# Patient Record
Sex: FEMALE | Race: WHITE | NOT HISPANIC OR LATINO | Employment: OTHER | URBAN - METROPOLITAN AREA
[De-identification: names, ages, dates, MRNs, and addresses within clinical notes are randomized per-mention and may not be internally consistent; named-entity substitution may affect disease eponyms.]

---

## 2019-02-26 LAB — EJECTION FRACTION: 38

## 2019-07-02 ENCOUNTER — TRANSFERRED RECORDS (OUTPATIENT)
Dept: HEALTH INFORMATION MANAGEMENT | Facility: CLINIC | Age: 64
End: 2019-07-02

## 2019-08-26 LAB — TSH SERPL-ACNC: 7.76 MIU/ML (ref 0.47–5)

## 2019-08-28 ENCOUNTER — TRANSFERRED RECORDS (OUTPATIENT)
Dept: HEALTH INFORMATION MANAGEMENT | Facility: CLINIC | Age: 64
End: 2019-08-28

## 2019-09-03 ENCOUNTER — ANESTHESIA EVENT (OUTPATIENT)
Dept: SURGERY | Facility: CLINIC | Age: 64
End: 2019-09-03
Payer: COMMERCIAL

## 2019-09-03 ENCOUNTER — ANESTHESIA (OUTPATIENT)
Dept: SURGERY | Facility: CLINIC | Age: 64
End: 2019-09-03
Payer: COMMERCIAL

## 2019-09-03 ENCOUNTER — HOSPITAL ENCOUNTER (OUTPATIENT)
Facility: CLINIC | Age: 64
Discharge: HOME | End: 2019-09-03
Attending: OPHTHALMOLOGY | Admitting: OPHTHALMOLOGY
Payer: COMMERCIAL

## 2019-09-03 VITALS
WEIGHT: 133.3 LBS | HEIGHT: 65 IN | RESPIRATION RATE: 12 BRPM | SYSTOLIC BLOOD PRESSURE: 136 MMHG | HEART RATE: 63 BPM | TEMPERATURE: 96.7 F | DIASTOLIC BLOOD PRESSURE: 81 MMHG | OXYGEN SATURATION: 97 % | BODY MASS INDEX: 22.21 KG/M2

## 2019-09-03 PROCEDURE — 71000027 ZZH RECOVERY PHASE 2 EACH 15 MINS: Performed by: OPHTHALMOLOGY

## 2019-09-03 PROCEDURE — 37000009 ZZH ANESTHESIA TECHNICAL FEE, EACH ADDTL 15 MIN: Performed by: OPHTHALMOLOGY

## 2019-09-03 PROCEDURE — 37000008 ZZH ANESTHESIA TECHNICAL FEE, 1ST 30 MIN: Performed by: OPHTHALMOLOGY

## 2019-09-03 PROCEDURE — 25000128 H RX IP 250 OP 636: Performed by: NURSE ANESTHETIST, CERTIFIED REGISTERED

## 2019-09-03 PROCEDURE — 25000132 ZZH RX MED GY IP 250 OP 250 PS 637: Performed by: OPHTHALMOLOGY

## 2019-09-03 PROCEDURE — 71000012 ZZH RECOVERY PHASE 1 LEVEL 1 FIRST HR: Performed by: OPHTHALMOLOGY

## 2019-09-03 PROCEDURE — 25000125 ZZHC RX 250: Performed by: OPHTHALMOLOGY

## 2019-09-03 PROCEDURE — 40000170 ZZH STATISTIC PRE-PROCEDURE ASSESSMENT II: Performed by: OPHTHALMOLOGY

## 2019-09-03 PROCEDURE — 25000125 ZZHC RX 250: Performed by: NURSE ANESTHETIST, CERTIFIED REGISTERED

## 2019-09-03 PROCEDURE — 36000058 ZZH SURGERY LEVEL 3 EA 15 ADDTL MIN: Performed by: OPHTHALMOLOGY

## 2019-09-03 PROCEDURE — 27210794 ZZH OR GENERAL SUPPLY STERILE: Performed by: OPHTHALMOLOGY

## 2019-09-03 PROCEDURE — 36000056 ZZH SURGERY LEVEL 3 1ST 30 MIN: Performed by: OPHTHALMOLOGY

## 2019-09-03 PROCEDURE — 25800030 ZZH RX IP 258 OP 636: Performed by: NURSE ANESTHETIST, CERTIFIED REGISTERED

## 2019-09-03 RX ORDER — TRAMADOL HYDROCHLORIDE 50 MG/1
50 TABLET ORAL EVERY 6 HOURS PRN
Status: ON HOLD | COMMUNITY
End: 2019-09-03

## 2019-09-03 RX ORDER — ROSUVASTATIN CALCIUM 5 MG/1
5 TABLET, COATED ORAL DAILY
COMMUNITY
End: 2023-01-11

## 2019-09-03 RX ORDER — ERYTHROMYCIN 5 MG/G
OINTMENT OPHTHALMIC PRN
Status: DISCONTINUED | OUTPATIENT
Start: 2019-09-03 | End: 2019-09-03 | Stop reason: HOSPADM

## 2019-09-03 RX ORDER — LEVOTHYROXINE SODIUM 25 UG/1
25 TABLET ORAL DAILY
COMMUNITY
End: 2023-01-11

## 2019-09-03 RX ORDER — DIAZEPAM 5 MG
5 TABLET ORAL DAILY PRN
Status: ON HOLD | COMMUNITY
End: 2019-09-03

## 2019-09-03 RX ORDER — LIDOCAINE HYDROCHLORIDE 20 MG/ML
INJECTION, SOLUTION INFILTRATION; PERINEURAL PRN
Status: DISCONTINUED | OUTPATIENT
Start: 2019-09-03 | End: 2019-09-03

## 2019-09-03 RX ORDER — AMIODARONE HYDROCHLORIDE 200 MG/1
200 TABLET ORAL DAILY
Status: ON HOLD | COMMUNITY
End: 2019-09-03

## 2019-09-03 RX ORDER — ERYTHROMYCIN 5 MG/G
OINTMENT OPHTHALMIC
Status: DISCONTINUED
Start: 2019-09-03 | End: 2019-09-03 | Stop reason: HOSPADM

## 2019-09-03 RX ORDER — ACETAMINOPHEN 325 MG/1
325 TABLET ORAL EVERY 4 HOURS PRN
COMMUNITY

## 2019-09-03 RX ORDER — ASPIRIN 81 MG/1
81 TABLET, CHEWABLE ORAL DAILY
Status: ON HOLD | COMMUNITY
End: 2019-09-03

## 2019-09-03 RX ORDER — OMEPRAZOLE 40 MG/1
40 CAPSULE, DELAYED RELEASE ORAL DAILY
COMMUNITY
End: 2023-01-11

## 2019-09-03 RX ORDER — TETRACAINE HYDROCHLORIDE 5 MG/ML
SOLUTION OPHTHALMIC
Status: DISCONTINUED
Start: 2019-09-03 | End: 2019-09-03 | Stop reason: HOSPADM

## 2019-09-03 RX ORDER — HYDROCODONE BITARTRATE AND ACETAMINOPHEN 5; 325 MG/1; MG/1
1 TABLET ORAL ONCE
Status: COMPLETED | OUTPATIENT
Start: 2019-09-03 | End: 2019-09-03

## 2019-09-03 RX ORDER — LIDOCAINE HCL/EPINEPHRINE/PF 2%-1:200K
VIAL (ML) INJECTION
Status: DISCONTINUED
Start: 2019-09-03 | End: 2019-09-03 | Stop reason: HOSPADM

## 2019-09-03 RX ORDER — PROPOFOL 10 MG/ML
INJECTION, EMULSION INTRAVENOUS PRN
Status: DISCONTINUED | OUTPATIENT
Start: 2019-09-03 | End: 2019-09-03

## 2019-09-03 RX ORDER — ONDANSETRON 2 MG/ML
INJECTION INTRAMUSCULAR; INTRAVENOUS PRN
Status: DISCONTINUED | OUTPATIENT
Start: 2019-09-03 | End: 2019-09-03

## 2019-09-03 RX ORDER — SODIUM CHLORIDE, SODIUM LACTATE, POTASSIUM CHLORIDE, CALCIUM CHLORIDE 600; 310; 30; 20 MG/100ML; MG/100ML; MG/100ML; MG/100ML
INJECTION, SOLUTION INTRAVENOUS CONTINUOUS PRN
Status: DISCONTINUED | OUTPATIENT
Start: 2019-09-03 | End: 2019-09-03

## 2019-09-03 RX ORDER — LIDOCAINE HCL/EPINEPHRINE/PF 2%-1:200K
VIAL (ML) INJECTION PRN
Status: DISCONTINUED | OUTPATIENT
Start: 2019-09-03 | End: 2019-09-03 | Stop reason: HOSPADM

## 2019-09-03 RX ADMIN — HYDROCODONE BITARTRATE AND ACETAMINOPHEN 1 TABLET: 5; 325 TABLET ORAL at 10:01

## 2019-09-03 RX ADMIN — PROPOFOL 20 MG: 10 INJECTION, EMULSION INTRAVENOUS at 08:39

## 2019-09-03 RX ADMIN — DEXMEDETOMIDINE HYDROCHLORIDE 12 MCG: 100 INJECTION, SOLUTION INTRAVENOUS at 08:38

## 2019-09-03 RX ADMIN — MIDAZOLAM 2 MG: 1 INJECTION INTRAMUSCULAR; INTRAVENOUS at 08:32

## 2019-09-03 RX ADMIN — SODIUM CHLORIDE, POTASSIUM CHLORIDE, SODIUM LACTATE AND CALCIUM CHLORIDE: 600; 310; 30; 20 INJECTION, SOLUTION INTRAVENOUS at 08:30

## 2019-09-03 RX ADMIN — PROPOFOL 50 MG: 10 INJECTION, EMULSION INTRAVENOUS at 08:37

## 2019-09-03 RX ADMIN — ONDANSETRON 4 MG: 2 INJECTION INTRAMUSCULAR; INTRAVENOUS at 08:39

## 2019-09-03 RX ADMIN — LIDOCAINE HYDROCHLORIDE 50 MG: 20 INJECTION, SOLUTION INFILTRATION; PERINEURAL at 08:37

## 2019-09-03 ASSESSMENT — LIFESTYLE VARIABLES: TOBACCO_USE: 1

## 2019-09-03 ASSESSMENT — MIFFLIN-ST. JEOR: SCORE: 1165.52

## 2019-09-03 ASSESSMENT — ENCOUNTER SYMPTOMS: DYSRHYTHMIAS: 1

## 2019-09-03 NOTE — ANESTHESIA CARE TRANSFER NOTE
Patient: Hayden Ospina    Procedure(s):  BILATERAL UPPER LID PTOSIS AND MECHANICAL PTOSIS REPAIR    Diagnosis: BILATERAL UPPER LID PTOSIS; MECHANICAL PTOSIS  Diagnosis Additional Information: No value filed.    Anesthesia Type:   MAC     Note:  Airway :Room Air  Patient transferred to:PACU  Comments: Transferred to recovery room in recliner with armrests up, spontaneous respirations, O2 saturation maintained greater than 95% with oxygen via room air. All monitors and alarms on and functioning, clinically stable vital signs. Report given to recovery RN and questions answered. Patient alert and following verbal directions.Handoff Report: Identifed the Patient, Identified the Reponsible Provider, Reviewed the pertinent medical history, Discussed the surgical course, Reviewed Intra-OP anesthesia mangement and issues during anesthesia, Set expectations for post-procedure period and Allowed opportunity for questions and acknowledgement of understanding      Vitals: (Last set prior to Anesthesia Care Transfer)    CRNA VITALS  9/3/2019 0858 - 9/3/2019 0933      9/3/2019             Ht Rate:  SpO2: 81  97%                Electronically Signed By: TUAN South CRNA  September 3, 2019  9:33 AM

## 2019-09-03 NOTE — ANESTHESIA PREPROCEDURE EVALUATION
Anesthesia Pre-Procedure Evaluation    Patient: Hayden Ospina   MRN: [id number] : 1955          Preoperative Diagnosis: BILATERAL UPPER LID PTOSIS; MECHANICAL PTOSIS    Procedure(s):  BILATERAL UPPER LID PTOSIS AND MECHANICAL PTOSIS REPAIR    Past Medical History:   Diagnosis Date     Angioma      AV gurpreet re-entry tachycardia (H)      CPT2 deficiency (H)      Diastolic dysfunction      Dysphagia      Encephalitis      Hiatal hernia      Hyperlipidemia      IBS (irritable bowel syndrome)      ICD (implantable cardioverter-defibrillator) battery depletion      Lactic acidosis      Malignant neoplasm of skin      Migraine      Peptic ulcer      Psoriasis      Retinal detachment      Sarcoid myocarditis      Tachycardia      Ventricular tachyarrhythmia (H)      No past surgical history on file.     KG - 2019- SR, RAD, Septal infarct    Anesthesia Evaluation     . Pt has had prior anesthetic.     History of anesthetic complications   - PONV        ROS/MED HX    ENT/Pulmonary:     (+)tobacco use, , . .   (-) sleep apnea   Neurologic: Comment: Hx of encephalitis    (+)migraines,     Cardiovascular: Comment: AV gurpreet reentry tachycardia    Hx of Vtach - s/p AICD  Nonischemic cardiomyopathy  Sarcoid myocarditis     EF 57% 2018: EF 30 to 40% 2019, mild to moderate MR    Normal coronaries - 2019 - by angiogram    VT with ICD shocks 2019, no recurrence, patient was outside with temps well below zero    (+) Dyslipidemia, ----. : . . . :ICD . dysrhythmias Irregular Heartbeat/Palpitations, .       METS/Exercise Tolerance:     Hematologic:         Musculoskeletal: Comment: Lid ptosis  psoriasis        GI/Hepatic: Comment: Hx of peptic ulcer disease    (+) GERD Asymptomatic on medication, hiatal hernia,       Renal/Genitourinary:         Endo: Comment: CPT2 deficiency - some muscle symptoms  Hx of glycogen storage disease    (+) thyroid problem .      Psychiatric:     (+) psychiatric history anxiety     "  Infectious Disease:  - neg infectious disease ROS       Malignancy:   (+) Malignancy History of Skin          Other:                          Physical Exam  Normal systems: cardiovascular and pulmonary    Airway   Mallampati: I  TM distance: >3 FB  Neck ROM: full    Dental   (+) partials    Cardiovascular   Rhythm and rate: regular and normal      Pulmonary    breath sounds clear to auscultation            No results found for: WBC, HGB, HCT, PLT, CRP, SED, NA, POTASSIUM, CHLORIDE, CO2, BUN, CR, GLC, GILDARDO, PHOS, MAG, ALBUMIN, PROTTOTAL, ALT, AST, GGT, ALKPHOS, BILITOTAL, BILIDIRECT, LIPASE, AMYLASE, SAWYER, PTT, INR, FIBR, TSH, T4, T3, HCG, HCGS, CKTOTAL, CKMB, TROPN    Preop Vitals  BP Readings from Last 3 Encounters:   09/03/19 (!) 144/80    Pulse Readings from Last 3 Encounters:   No data found for Pulse      Resp Readings from Last 3 Encounters:   09/03/19 16    SpO2 Readings from Last 3 Encounters:   09/03/19 98%      Temp Readings from Last 1 Encounters:   09/03/19 36.3  C (97.3  F) (Oral)    Ht Readings from Last 1 Encounters:   09/03/19 1.651 m (5' 5\")      Wt Readings from Last 1 Encounters:   09/03/19 60.5 kg (133 lb 4.8 oz)    Estimated body mass index is 22.18 kg/m  as calculated from the following:    Height as of this encounter: 1.651 m (5' 5\").    Weight as of this encounter: 60.5 kg (133 lb 4.8 oz).       Anesthesia Plan      History & Physical Review  History and physical reviewed and following examination; no interval change.    ASA Status:  3 .    NPO Status:  > 8 hours    Plan for MAC Reason for MAC:  Procedure to face, neck, head or breast  PONV prophylaxis:  Ondansetron (or other 5HT-3) and Dexamethasone or Solumedrol       Postoperative Care  Postoperative pain management:  IV analgesics.      Consents  Anesthetic plan, risks, benefits and alternatives discussed with:  Patient..                 Kendall Donato MD  "

## 2019-09-03 NOTE — ANESTHESIA POSTPROCEDURE EVALUATION
Patient: Hayden Ospina    Procedure(s):  BILATERAL UPPER LID PTOSIS AND MECHANICAL PTOSIS REPAIR    Diagnosis:BILATERAL UPPER LID PTOSIS; MECHANICAL PTOSIS  Diagnosis Additional Information: No value filed.    Anesthesia Type:  MAC    Note:  Anesthesia Post Evaluation    Patient location during evaluation: PACU  Patient participation: Able to fully participate in evaluation  Level of consciousness: awake  Pain management: adequate  Airway patency: patent  Cardiovascular status: acceptable  Respiratory status: acceptable  Hydration status: acceptable  PONV: none     Anesthetic complications: None          Last vitals:  Vitals:    09/03/19 0930 09/03/19 0945 09/03/19 1042   BP: (!) 140/79 134/70 136/81   Pulse: 73 71 63   Resp: 16 10 12   Temp: 35.9  C (96.7  F)  35.9  C (96.7  F)   SpO2: 94% 96% 97%         Electronically Signed By: Kendall Donato MD  September 3, 2019  3:38 PM

## 2019-09-03 NOTE — DISCHARGE INSTRUCTIONS
Same Day Surgery Discharge Instructions for  Sedation and General Anesthesia       It's not unusual to feel dizzy, light-headed or faint for up to 24 hours after surgery or while taking pain medication.  If you have these symptoms: sit for a few minutes before standing and have someone assist you when you get up to walk or use the bathroom.      You should rest and relax for the next 24 hours. We recommend you make arrangements to have an adult stay with you for at least 24 hours after your discharge.  Avoid hazardous and strenuous activity.      DO NOT DRIVE any vehicle or operate mechanical equipment for 24 hours following the end of your surgery.  Even though you may feel normal, your reactions may be affected by the medication you have received.      Do not drink alcoholic beverages for 24 hours following surgery.       Slowly progress to your regular diet as you feel able. It's not unusual to feel nauseated and/or vomit after receiving anesthesia.  If you develop these symptoms, drink clear liquids (apple juice, ginger ale, broth, 7-up, etc. ) until you feel better.  If your nausea and vomiting persists for 24 hours, please notify your surgeon.        All narcotic pain medications, along with inactivity and anesthesia, can cause constipation. Drinking plenty of liquids and increasing fiber intake will help.      For any questions of a medical nature, call your surgeon.      Do not make important decisions for 24 hours.      If you had general anesthesia, you may have a sore throat for a couple of days related to the breathing tube used during surgery.  You may use Cepacol lozenges to help with this discomfort.  If it worsens or if you develop a fever, contact your surgeon.       If you feel your pain is not well managed with the pain medications prescribed by your surgeon, please contact your surgeon's office to let them know so they can address your concerns.     [facility]   Eyelid/Orbital  Surgery Discharge Instructions    Ehsan Talamantes M.D..     ICE COMPRESSES  Immediately following surgery, you should begin to apply ice compresses.  Apply a cold gel pack or wrap a clean washcloth around a cup of crushed ice in a plastic bag (a bag of frozen peas also works well) and hold the cold compresses directly against the closed eyelid (s).Apply cold pack for a minimum of six times daily for no longer than 15 minutes at a time. Continue cold compresses every day until the bruising and swelling begin to subside.  This can vary for each patient, but three 3 days may be common.    HOT COMPRESSES  After your swelling and bruising have begun to subside, hot compresses should be applied.  Take a clean washcloth and wring it out in hot water (as warm as you can tolerate comfortably).  Hold this warm compress against the closed eyelid(s) at least six times per day for 15 minutes.  This should be continued for about two weeks.    OINTMENT  You may be given some ointment when you leave the hospital.  Apply this ointment to the suture line(s) twice a day, 1/4 inch into the eye at bedtime for 7 days.  Expect some blurring of vision from the ointment.     ACTIVITY  Avoid heavy lifting or vigorous exercise for one week after surgery.  You may resume regular activities as tolerated.  You may shower and wash your hair on the day after surgery; be careful to avoid getting shampoo in your eyes. While your eyes are still swelling, it is recommended you sleep on your back and elevate your head with 2-3 pillows.    MEDICATION  If the doctor has given you some medications to take after surgery, please take these according to the instructions on the bottle.  Pain medications may make you drowsy so do not drive, operate heavy machinery, or use alcohol while taking it.  When you feel that you do not need the prescription pain medication, you may substitute Extra Strength Tylenol for mild pain by also following the directions on the  bottle.      YOU WERE GIVEN A HYDROCODONE AT THE HOSPITAL AT 10AM       If you were taking Coumadin (warfarin) prior to your surgery, you may resume this medication with your next scheduled dose.    WHAT TO EXPECT  You should expect some slight oozing of blood from the incision site over the first two to three days after surgery.  Swelling and bruising will occur for one to two weeks or longer.  You may also experience itching and tearing during the first several weeks after surgery.  This is part of the normal healing process    QUESTIONS  Please feel free to contact the office, should you have any questions that are not answered above.  The phone number is [phone].  Please call immediately if you are unable to establish vision in the operative eye, you are experiencing heavy bleeding that will not stop with gentle pressure or you have any signs of an infection (greenish/yellow discharge or progressive redness).    [facility]  [facility]  [address]  [place], [place] [address]  [phone]

## 2019-09-03 NOTE — OP NOTE
Preoperative Diagnosis: Bilateral Upper Lid Mechanical Ptosis  Postoperative Diagnosis: Bilateral Upper Lid Mechanical Ptosis  Operation: Bilateral Upper Lid Mechanical Ptosis repair    Anesthesia: Local    Complications: none    Indications for Surgery: Bilateral Upper lid mechanical ptosis obstructing the vision and interfering with daily activities.    Procedure: The patient was taken to the operating room and the upper eyelid creases were marked with a marking pen. The upper eyelids were then injected with 2% Lidocaine with 1:100,000 Epinephrine and Wadase given along both upper eyelid creases. The patient was prepped and draped in the usual sterile fashion. The planned skin incision was outlined with fine tipped marking pen.    A pair of .5 Castroviejo forceps were used to pinch the skin along the proposed excision to make sure that the eyelids would close well. Care was taken to avoid removing excessive skin and creating any lagophthalmos. This predetermined area was then excised with the #15 blade. This skin was then removed with Sridhar Scissors and the orbital septum was opened. Excessive orbital fat was excised. A small amount of orbicularis was also excised along the upper skin margin. The skin was then closed with running 6-0 fast absorbing gut suture.Erythromycin was applied to the suture line. At the end of the procedure, there was a normal contour to both upper eyelids and good closure of the eyelids. The patient left the operating room in stable condition.

## 2020-04-30 ENCOUNTER — HOSPITAL ENCOUNTER (EMERGENCY)
Facility: OTHER | Age: 65
Discharge: HOME | End: 2020-04-30
Payer: MEDICARE

## 2020-04-30 ENCOUNTER — HOSPITAL ENCOUNTER (EMERGENCY)
Facility: OTHER | Age: 65
End: 2020-04-30
Payer: MEDICARE

## 2023-01-11 ENCOUNTER — APPOINTMENT (OUTPATIENT)
Dept: GENERAL RADIOLOGY | Facility: OTHER | Age: 68
DRG: 310 | End: 2023-01-11
Attending: FAMILY MEDICINE
Payer: MEDICARE

## 2023-01-11 ENCOUNTER — HOSPITAL ENCOUNTER (INPATIENT)
Facility: OTHER | Age: 68
LOS: 2 days | Discharge: HOME | DRG: 310 | End: 2023-01-13
Attending: EMERGENCY MEDICINE | Admitting: FAMILY MEDICINE
Payer: MEDICARE
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
## 2023-01-11 DIAGNOSIS — Z95.810 PRESENCE OF AUTOMATIC CARDIOVERTER/DEFIBRILLATOR (AICD): ICD-10-CM

## 2023-01-11 DIAGNOSIS — I47.10 SVT (SUPRAVENTRICULAR TACHYCARDIA) (H): ICD-10-CM

## 2023-01-11 DIAGNOSIS — T82.897A OTHER SPECIFIED COMPLICATION OF CARDIAC PROSTHETIC DEVICES, IMPLANTS AND GRAFTS, INITIAL ENCOUNTER: ICD-10-CM

## 2023-01-11 DIAGNOSIS — Z45.02 AICD DISCHARGE: ICD-10-CM

## 2023-01-11 DIAGNOSIS — Z95.810 AICD (AUTOMATIC CARDIOVERTER/DEFIBRILLATOR) PRESENT: ICD-10-CM

## 2023-01-11 DIAGNOSIS — I47.20 VENTRICULAR TACHYCARDIA (H): Primary | ICD-10-CM

## 2023-01-11 PROBLEM — E87.20 LACTIC ACIDOSIS: Status: ACTIVE | Noted: 2019-06-11

## 2023-01-11 PROBLEM — I42.0 DILATED CARDIOMYOPATHY (H): Status: ACTIVE | Noted: 2022-10-25

## 2023-01-11 PROBLEM — E78.2 MIXED HYPERLIPIDEMIA: Status: ACTIVE | Noted: 2019-03-07

## 2023-01-11 PROBLEM — K64.8 INTERNAL HEMORRHOIDS WITHOUT COMPLICATION: Status: ACTIVE | Noted: 2021-03-16

## 2023-01-11 PROBLEM — F17.210 TOBACCO DEPENDENCE DUE TO CIGARETTES: Status: ACTIVE | Noted: 2018-06-30

## 2023-01-11 PROBLEM — H20.9 UVEITIS: Status: ACTIVE | Noted: 2018-06-30

## 2023-01-11 PROBLEM — Z85.828 HISTORY OF MALIGNANT NEOPLASM OF SKIN: Status: ACTIVE | Noted: 2018-01-29

## 2023-01-11 PROBLEM — I47.19 ATRIOVENTRICULAR NODAL RE-ENTRY TACHYCARDIA (H): Status: ACTIVE | Noted: 2017-03-20

## 2023-01-11 PROBLEM — E06.3 HYPOTHYROIDISM DUE TO HASHIMOTO'S THYROIDITIS: Status: ACTIVE | Noted: 2020-10-05

## 2023-01-11 PROBLEM — K58.2 MIXED IRRITABLE BOWEL SYNDROME: Status: ACTIVE | Noted: 2018-06-30

## 2023-01-11 PROBLEM — I48.91 ATRIAL FIBRILLATION (H): Status: ACTIVE | Noted: 2021-06-21

## 2023-01-11 PROBLEM — L40.50 PSORIASIS WITH ARTHROPATHY (H): Status: ACTIVE | Noted: 2018-06-30

## 2023-01-11 PROBLEM — H33.8 OTHER RETINAL DETACHMENTS: Status: ACTIVE | Noted: 2018-06-30

## 2023-01-11 PROBLEM — G43.909 MIGRAINE HEADACHE: Status: ACTIVE | Noted: 2018-06-30

## 2023-01-11 PROBLEM — D86.85 SARCOID MYOCARDITIS: Status: ACTIVE | Noted: 2018-06-30

## 2023-01-11 PROBLEM — R06.09 DYSPNEA ON EXERTION: Status: ACTIVE | Noted: 2018-06-30

## 2023-01-11 PROBLEM — Z87.11 HISTORY OF PEPTIC ULCER: Status: ACTIVE | Noted: 2018-06-30

## 2023-01-11 PROBLEM — I70.0 AORTIC ATHEROSCLEROSIS (H): Status: ACTIVE | Noted: 2021-08-20

## 2023-01-11 PROBLEM — L57.8 OTHER SKIN CHANGES DUE TO CHRONIC EXPOSURE TO NONIONIZING RADIATION: Status: ACTIVE | Noted: 2018-01-29

## 2023-01-11 PROBLEM — Z86.61 HISTORY OF ENCEPHALITIS: Status: ACTIVE | Noted: 2018-06-30

## 2023-01-11 PROBLEM — R73.03 PRE-DIABETES: Status: ACTIVE | Noted: 2021-09-13

## 2023-01-11 LAB
ALBUMIN SERPL BCG-MCNC: 4.9 G/DL (ref 3.5–5.2)
ALP SERPL-CCNC: 105 U/L (ref 35–104)
ALT SERPL W P-5'-P-CCNC: 19 U/L (ref 10–35)
ANION GAP SERPL CALCULATED.3IONS-SCNC: 15 MMOL/L (ref 7–15)
AST SERPL W P-5'-P-CCNC: 22 U/L (ref 10–35)
BASOPHILS # BLD AUTO: 0.1 10E3/UL (ref 0–0.2)
BASOPHILS NFR BLD AUTO: 1 %
BILIRUB SERPL-MCNC: 0.6 MG/DL
BUN SERPL-MCNC: 8.8 MG/DL (ref 8–23)
CALCIUM SERPL-MCNC: 10.2 MG/DL (ref 8.8–10.2)
CHLORIDE SERPL-SCNC: 100 MMOL/L (ref 98–107)
CREAT SERPL-MCNC: 0.76 MG/DL (ref 0.51–0.95)
DEPRECATED HCO3 PLAS-SCNC: 22 MMOL/L (ref 22–29)
EOSINOPHIL # BLD AUTO: 0.2 10E3/UL (ref 0–0.7)
EOSINOPHIL NFR BLD AUTO: 2 %
ERYTHROCYTE [DISTWIDTH] IN BLOOD BY AUTOMATED COUNT: 13.2 % (ref 10–15)
GFR SERPL CREATININE-BSD FRML MDRD: 86 ML/MIN/1.73M2
GLUCOSE SERPL-MCNC: 143 MG/DL (ref 70–99)
HCT VFR BLD AUTO: 46.2 % (ref 35–47)
HGB BLD-MCNC: 16.1 G/DL (ref 11.7–15.7)
HOLD SPECIMEN: NORMAL
HOLD SPECIMEN: NORMAL
IMM GRANULOCYTES # BLD: 0 10E3/UL
IMM GRANULOCYTES NFR BLD: 0 %
LYMPHOCYTES # BLD AUTO: 2.2 10E3/UL (ref 0.8–5.3)
LYMPHOCYTES NFR BLD AUTO: 19 %
MCH RBC QN AUTO: 31.1 PG (ref 26.5–33)
MCHC RBC AUTO-ENTMCNC: 34.8 G/DL (ref 31.5–36.5)
MCV RBC AUTO: 89 FL (ref 78–100)
MONOCYTES # BLD AUTO: 0.6 10E3/UL (ref 0–1.3)
MONOCYTES NFR BLD AUTO: 5 %
NEUTROPHILS # BLD AUTO: 8.5 10E3/UL (ref 1.6–8.3)
NEUTROPHILS NFR BLD AUTO: 73 %
NRBC # BLD AUTO: 0 10E3/UL
NRBC BLD AUTO-RTO: 0 /100
NT-PROBNP SERPL-MCNC: 692 PG/ML (ref 0–900)
PLATELET # BLD AUTO: 290 10E3/UL (ref 150–450)
POTASSIUM SERPL-SCNC: 4 MMOL/L (ref 3.4–5.3)
PROT SERPL-MCNC: 8 G/DL (ref 6.4–8.3)
RBC # BLD AUTO: 5.18 10E6/UL (ref 3.8–5.2)
SODIUM SERPL-SCNC: 137 MMOL/L (ref 136–145)
TROPONIN T SERPL HS-MCNC: 8 NG/L
WBC # BLD AUTO: 11.6 10E3/UL (ref 4–11)

## 2023-01-11 PROCEDURE — 84484 ASSAY OF TROPONIN QUANT: CPT | Performed by: EMERGENCY MEDICINE

## 2023-01-11 PROCEDURE — 83880 ASSAY OF NATRIURETIC PEPTIDE: CPT | Performed by: FAMILY MEDICINE

## 2023-01-11 PROCEDURE — 99284 EMERGENCY DEPT VISIT MOD MDM: CPT | Performed by: EMERGENCY MEDICINE

## 2023-01-11 PROCEDURE — 71045 X-RAY EXAM CHEST 1 VIEW: CPT

## 2023-01-11 PROCEDURE — 250N000013 HC RX MED GY IP 250 OP 250 PS 637: Performed by: FAMILY MEDICINE

## 2023-01-11 PROCEDURE — 36415 COLL VENOUS BLD VENIPUNCTURE: CPT | Performed by: EMERGENCY MEDICINE

## 2023-01-11 PROCEDURE — 200N000001 HC R&B ICU

## 2023-01-11 PROCEDURE — 80053 COMPREHEN METABOLIC PANEL: CPT | Performed by: EMERGENCY MEDICINE

## 2023-01-11 PROCEDURE — 85025 COMPLETE CBC W/AUTO DIFF WBC: CPT | Performed by: EMERGENCY MEDICINE

## 2023-01-11 PROCEDURE — 93010 ELECTROCARDIOGRAM REPORT: CPT | Performed by: STUDENT IN AN ORGANIZED HEALTH CARE EDUCATION/TRAINING PROGRAM

## 2023-01-11 PROCEDURE — 99285 EMERGENCY DEPT VISIT HI MDM: CPT | Mod: 25 | Performed by: EMERGENCY MEDICINE

## 2023-01-11 PROCEDURE — 99222 1ST HOSP IP/OBS MODERATE 55: CPT | Mod: AI | Performed by: FAMILY MEDICINE

## 2023-01-11 PROCEDURE — 93005 ELECTROCARDIOGRAM TRACING: CPT | Performed by: EMERGENCY MEDICINE

## 2023-01-11 RX ORDER — PROCHLORPERAZINE MALEATE 5 MG
5 TABLET ORAL EVERY 6 HOURS PRN
Status: DISCONTINUED | OUTPATIENT
Start: 2023-01-11 | End: 2023-01-13 | Stop reason: HOSPADM

## 2023-01-11 RX ORDER — LEVOTHYROXINE SODIUM 50 UG/1
50 TABLET ORAL DAILY
COMMUNITY

## 2023-01-11 RX ORDER — LEVOTHYROXINE SODIUM 25 UG/1
50 TABLET ORAL DAILY
Status: DISCONTINUED | OUTPATIENT
Start: 2023-01-11 | End: 2023-01-13 | Stop reason: HOSPADM

## 2023-01-11 RX ORDER — ROSUVASTATIN CALCIUM 10 MG/1
10 TABLET, COATED ORAL DAILY
COMMUNITY

## 2023-01-11 RX ORDER — DEXTROSE MONOHYDRATE 25 G/50ML
25-50 INJECTION, SOLUTION INTRAVENOUS
Status: DISCONTINUED | OUTPATIENT
Start: 2023-01-11 | End: 2023-01-13 | Stop reason: HOSPADM

## 2023-01-11 RX ORDER — NICOTINE POLACRILEX 4 MG
15-30 LOZENGE BUCCAL
Status: DISCONTINUED | OUTPATIENT
Start: 2023-01-11 | End: 2023-01-13 | Stop reason: HOSPADM

## 2023-01-11 RX ORDER — ENOXAPARIN SODIUM 100 MG/ML
40 INJECTION SUBCUTANEOUS EVERY 24 HOURS
Status: DISCONTINUED | OUTPATIENT
Start: 2023-01-11 | End: 2023-01-13 | Stop reason: HOSPADM

## 2023-01-11 RX ORDER — OMEPRAZOLE 40 MG/1
40 CAPSULE, DELAYED RELEASE ORAL DAILY
Status: DISCONTINUED | OUTPATIENT
Start: 2023-01-11 | End: 2023-01-11 | Stop reason: CLARIF

## 2023-01-11 RX ORDER — ACETAMINOPHEN 650 MG/1
650 SUPPOSITORY RECTAL EVERY 6 HOURS PRN
Status: DISCONTINUED | OUTPATIENT
Start: 2023-01-11 | End: 2023-01-13 | Stop reason: HOSPADM

## 2023-01-11 RX ORDER — AMOXICILLIN 250 MG
2 CAPSULE ORAL 2 TIMES DAILY PRN
Status: DISCONTINUED | OUTPATIENT
Start: 2023-01-11 | End: 2023-01-13 | Stop reason: HOSPADM

## 2023-01-11 RX ORDER — AMOXICILLIN 250 MG
1 CAPSULE ORAL 2 TIMES DAILY PRN
Status: DISCONTINUED | OUTPATIENT
Start: 2023-01-11 | End: 2023-01-13 | Stop reason: HOSPADM

## 2023-01-11 RX ORDER — ATORVASTATIN CALCIUM 20 MG/1
20 TABLET, FILM COATED ORAL AT BEDTIME
Status: DISCONTINUED | OUTPATIENT
Start: 2023-01-11 | End: 2023-01-12

## 2023-01-11 RX ORDER — DIAZEPAM 5 MG
5 TABLET ORAL EVERY 6 HOURS PRN
Status: DISCONTINUED | OUTPATIENT
Start: 2023-01-11 | End: 2023-01-13 | Stop reason: HOSPADM

## 2023-01-11 RX ORDER — LIDOCAINE 40 MG/G
CREAM TOPICAL
Status: DISCONTINUED | OUTPATIENT
Start: 2023-01-11 | End: 2023-01-13 | Stop reason: HOSPADM

## 2023-01-11 RX ORDER — SOTALOL HYDROCHLORIDE 120 MG/1
120 TABLET ORAL 2 TIMES DAILY
Status: ON HOLD | COMMUNITY
End: 2023-01-13

## 2023-01-11 RX ORDER — PROCHLORPERAZINE 25 MG
12.5 SUPPOSITORY, RECTAL RECTAL EVERY 12 HOURS PRN
Status: DISCONTINUED | OUTPATIENT
Start: 2023-01-11 | End: 2023-01-13 | Stop reason: HOSPADM

## 2023-01-11 RX ORDER — ONDANSETRON 4 MG/1
4 TABLET, ORALLY DISINTEGRATING ORAL EVERY 6 HOURS PRN
Status: DISCONTINUED | OUTPATIENT
Start: 2023-01-11 | End: 2023-01-13 | Stop reason: HOSPADM

## 2023-01-11 RX ORDER — ONDANSETRON 2 MG/ML
4 INJECTION INTRAMUSCULAR; INTRAVENOUS EVERY 6 HOURS PRN
Status: DISCONTINUED | OUTPATIENT
Start: 2023-01-11 | End: 2023-01-13 | Stop reason: HOSPADM

## 2023-01-11 RX ORDER — LEVOTHYROXINE SODIUM 25 UG/1
25 TABLET ORAL DAILY
Status: DISCONTINUED | OUTPATIENT
Start: 2023-01-11 | End: 2023-01-11 | Stop reason: DRUGHIGH

## 2023-01-11 RX ORDER — ROSUVASTATIN CALCIUM 5 MG/1
5 TABLET, COATED ORAL DAILY
Status: DISCONTINUED | OUTPATIENT
Start: 2023-01-11 | End: 2023-01-11 | Stop reason: CLARIF

## 2023-01-11 RX ORDER — ACETAMINOPHEN 325 MG/1
325 TABLET ORAL EVERY 4 HOURS PRN
Status: DISCONTINUED | OUTPATIENT
Start: 2023-01-11 | End: 2023-01-11 | Stop reason: DRUGHIGH

## 2023-01-11 RX ORDER — ACETAMINOPHEN 325 MG/1
650 TABLET ORAL EVERY 6 HOURS PRN
Status: DISCONTINUED | OUTPATIENT
Start: 2023-01-11 | End: 2023-01-13 | Stop reason: HOSPADM

## 2023-01-11 RX ORDER — PANTOPRAZOLE SODIUM 40 MG/1
40 TABLET, DELAYED RELEASE ORAL DAILY
Status: DISCONTINUED | OUTPATIENT
Start: 2023-01-11 | End: 2023-01-13 | Stop reason: HOSPADM

## 2023-01-11 RX ADMIN — ATORVASTATIN CALCIUM 20 MG: 20 TABLET, FILM COATED ORAL at 21:37

## 2023-01-11 RX ADMIN — ACETAMINOPHEN 325 MG: 325 TABLET ORAL at 15:16

## 2023-01-11 RX ADMIN — DIAZEPAM 5 MG: 5 TABLET ORAL at 15:16

## 2023-01-11 ASSESSMENT — ACTIVITIES OF DAILY LIVING (ADL)
HEARING_DIFFICULTY_OR_DEAF: NO
ADLS_ACUITY_SCORE: 35
ADLS_ACUITY_SCORE: 20
CHANGE_IN_FUNCTIONAL_STATUS_SINCE_ONSET_OF_CURRENT_ILLNESS/INJURY: NO
ADLS_ACUITY_SCORE: 20
WALKING_OR_CLIMBING_STAIRS_DIFFICULTY: NO
DOING_ERRANDS_INDEPENDENTLY_DIFFICULTY: NO
TOILETING_ISSUES: NO
ADLS_ACUITY_SCORE: 35
WEAR_GLASSES_OR_BLIND: OTHER (SEE COMMENTS)
ADLS_ACUITY_SCORE: 20
CONCENTRATING,_REMEMBERING_OR_MAKING_DECISIONS_DIFFICULTY: NO
ADLS_ACUITY_SCORE: 35
FALL_HISTORY_WITHIN_LAST_SIX_MONTHS: NO
DIFFICULTY_EATING/SWALLOWING: NO
ADLS_ACUITY_SCORE: 20
DRESSING/BATHING_DIFFICULTY: NO
DIFFICULTY_COMMUNICATING: NO

## 2023-01-11 ASSESSMENT — ENCOUNTER SYMPTOMS
DYSURIA: 0
LIGHT-HEADEDNESS: 0
NAUSEA: 0
SHORTNESS OF BREATH: 0
AGITATION: 0
CHEST TIGHTNESS: 1
CHILLS: 0
VOMITING: 0
PALPITATIONS: 1
FEVER: 0
ARTHRALGIAS: 0

## 2023-01-11 NOTE — ED TRIAGE NOTES
ED Nursing Triage Note (General)   ________________________________    Hayden Ospina is a 67 year old Female that presents to triage via private vehicle with complaints of cardiac arrhythmia.  Patient states she was at home and around 0830 she states her ICD shocked her causing her to have a syncopal episode.  Patient states she had 2 syncopal episodes prior to arrival.  Patient denies hitting her head,  was present. Patient states she was recently seen and received a cardiac work up.  Patient states she has an upcoming apt scheduled at [place] for Friday.   Significant symptoms had onset 2 hours ago.  Patient appears alert behavior.  GCS-15  Airway:intact  Breathing noted as Normal  Action taken: 2      PRE HOSPITAL PRIOR LIVING SITUATION-home

## 2023-01-11 NOTE — ED PROVIDER NOTES
History     Chief Complaint   Patient presents with     Irregular Heart Beat     HPI  Hayden Ospina is a 67 year old female who is here in the emergency department after having been shocked twice this morning by her implantable defibrillator.  She was also shocked a couple of times a few days ago.  She is seen by cardiology down at [facility], they contacted her after the episodes today and spoke with her.  There is a note in the chart that I did review.  On this note it says that she was in a monomorphic VT between 187 to 242 ms.  She was shocked with this and subsequently was back in sinus rhythm.  On arrival here she is not having any palpitations.  She does report some chest tightness at this time.  She states when she woke up this morning she was feeling pretty much her normal self.  No recent changes in her health and denies any acute symptoms such as fevers or chills, no new URI type symptoms.  She says she has been having hard time eating much for quite some time but that has not changed.  She is drinking liquids.  She says since she has been shocked recently she has had some diarrhea, only twice per day and is not black or bloody.    Allergies:  Allergies   Allergen Reactions     Amitriptyline      Increased intraocular pressure     Doxylamine Hives     doxylamin-pse-dm-acetaminophen (cough medicine)     Fluticasone      Fluticasone propion-salmeterol-pt unusure     Penicillins Nausea and Vomiting     With most antibiotics per pt report.  Pt denies anaphylaxis (as on H&P)       Problem List:    Patient Active Problem List    Diagnosis Date Noted     SVT (supraventricular tachycardia) (H) 01/11/2023     Priority: Medium     AICD discharge 01/11/2023     Priority: Medium     Dilated cardiomyopathy (H) 10/25/2022     Priority: Medium     Pre-diabetes 09/13/2021     Priority: Medium     Aortic atherosclerosis (H) 08/20/2021     Priority: Medium     Formatting of this note might be different from the  original.  Noted on CT 8/20/2021       Atrial fibrillation (H) 06/21/2021     Priority: Medium     Internal hemorrhoids without complication 03/16/2021     Priority: Medium     Formatting of this note might be different from the original.  Colonoscopy 12/2014       Hypothyroidism due to Hashimoto's thyroiditis 10/05/2020     Priority: Medium     Formatting of this note might be different from the original.  Elevated anti-TPO       Lactic acidosis 06/11/2019     Priority: Medium     Mixed hyperlipidemia 03/07/2019     Priority: Medium     AICD (automatic cardioverter/defibrillator) present 02/26/2019     Priority: Medium     Dyspnea on exertion 06/30/2018     Priority: Medium     History of encephalitis 06/30/2018     Priority: Medium     Formatting of this note might be different from the original.  In her 30's       History of peptic ulcer 06/30/2018     Priority: Medium     Migraine headache 06/30/2018     Priority: Medium     Mixed irritable bowel syndrome 06/30/2018     Priority: Medium     Other retinal detachments 06/30/2018     Priority: Medium     Formatting of this note might be different from the original.  Overview:   Left eye, age 19  Formatting of this note might be different from the original.  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  Cutaneous       Glycogen storage disease (H) 01/03/2011     Priority: Medium        Past Medical History:    Past Medical History:   Diagnosis Date     Angioma      Anxiety      Anxiety      AV gurpreet re-entry tachycardia (H)      AVNRT (AV gurpreet re-entry tachycardia) (H)      CPT2 deficiency (H)      Diastolic dysfunction      Dysphagia      Encephalitis      Hiatal hernia      Hiatal hernia      HLD (hyperlipidemia)      Hyperlipidemia      IBS (irritable bowel syndrome)      ICD (implantable cardioverter-defibrillator) battery depletion      Lactic acidosis      Malignant neoplasm of skin      Meningitis      Migraine      Myalgia      Nevus      Palpitations      Peptic ulcer      Psoriasis      Psoriasis      Retinal detachment      Sarcoid myocarditis      Sarcoidosis      Swallowing difficulty      Tachycardia      Thyroid disease      Uveitis      Ventricular tachyarrhythmia      VT (ventricular tachycardia)        Past Surgical History:    Past Surgical History:   Procedure Laterality Date     APPENDECTOMY       CARDIAC SURGERY      ICD pacemaker     CHOLECYSTECTOMY       ENT SURGERY      tonsillectomy     EYE SURGERY      retinal detachment     GYN SURGERY      fallopian tube resection/tubal     ORTHOPEDIC SURGERY      \"knee surgery\"     REPAIR PTOSIS BILATERAL Bilateral 9/3/2019    Procedure: BILATERAL UPPER LID PTOSIS AND MECHANICAL PTOSIS REPAIR;  Surgeon: Ehsan Talamantes MD;  Location:  OR       Family History:    History reviewed. No pertinent family history.    Social History:  Marital Status:   [2]  Social History     Tobacco Use     Smoking status: Every Day     Packs/day: 0.50     Types: Cigarettes     Smokeless tobacco: Never "   Substance Use Topics     Alcohol use: Yes     Comment: rarely     Drug use: Never        Medications:    acetaminophen (TYLENOL) 325 MG tablet  levothyroxine (SYNTHROID/LEVOTHROID) 25 MCG tablet  omeprazole (PRILOSEC) 40 MG DR capsule  rosuvastatin (CRESTOR) 5 MG tablet          Review of Systems   Constitutional: Negative for chills and fever.   HENT: Negative for congestion.    Eyes: Negative for visual disturbance.   Respiratory: Positive for chest tightness. Negative for shortness of breath.    Cardiovascular: Positive for palpitations. Negative for chest pain.   Gastrointestinal: Negative for nausea and vomiting.   Genitourinary: Negative for dysuria.   Musculoskeletal: Negative for arthralgias.   Skin: Negative for rash.   Neurological: Negative for light-headedness.   Psychiatric/Behavioral: Negative for agitation.       Physical Exam   BP: (!) 166/112  Pulse: 67  Temp: 97.7  F (36.5  C)  Resp: 18  Weight: 60.3 kg (133 lb)  SpO2: 100 %      Physical Exam  Vitals and nursing note reviewed.   Constitutional:       Appearance: Normal appearance.   HENT:      Head: Normocephalic and atraumatic.      Mouth/Throat:      Mouth: Mucous membranes are moist.   Eyes:      Conjunctiva/sclera: Conjunctivae normal.   Cardiovascular:      Rate and Rhythm: Normal rate and regular rhythm.      Heart sounds: Normal heart sounds.   Pulmonary:      Effort: Pulmonary effort is normal.      Breath sounds: Normal breath sounds.   Abdominal:      General: Abdomen is flat. Bowel sounds are normal.   Skin:     General: Skin is warm and dry.   Neurological:      Mental Status: She is alert and oriented to person, place, and time.   Psychiatric:         Behavior: Behavior normal.         ED Course              ED Course as of 01/11/23 1150   Wed Jan 11, 2023   1122 Right after I initially assessed the patient, I called [facility] and spoke with her cardiologist, Dr. Alejandro, at [phone].  She had been involved this morning after  they received the report of her being defibrillated twice.  Patient is currently on sotalol, she feels that this is not adequately holding her and wants to transition her to amiodarone.  She feels that the patient should be admitted, she wanted us to wait 24 hours after her last dose of sotalol, which was this morning, before starting amiodarone.  She recommended starting with 400 mg 4 times daily for 3 days, followed by 400 mg twice daily for 5 days, followed by 400 mg daily for 1 week, followed by 200 mg daily maintenance dose after that.  She felt that the patient should be initiated on this regimen in the hospital to be observed with telemetry, she did not feel that she needed necessarily to be transferred to [facility] for this.  She felt she may need to be hospitalized for 3 or 4 days to make sure she is doing well with this.  Patient is currently resting comfortably.  I am awaiting lab work.  After this returns I will contact hospitalist to see if putting urine here is an option.     Procedures         EKG shows sinus rhythm with PVCs.  Rate of 74 bpm.  No acute ST segment or T wave changes.  Prolonged QT at 480/532.  Rhythm strip was also obtained which shows sinus rhythm at 65 bpm.  No PVCs seen on this.  No other changes.       Results for orders placed or performed during the hospital encounter of 01/11/23 (from the past 24 hour(s))   CBC with platelets differential    Narrative    The following orders were created for panel order CBC with platelets differential.  Procedure                               Abnormality         Status                     ---------                               -----------         ------                     CBC with platelets and d...[[id number]]  Abnormal            Final result                 Please view results for these tests on the individual orders.   Troponin T, High Sensitivity   Result Value Ref Range    Troponin T, High Sensitivity 8 <=14 ng/L   Comprehensive  metabolic panel   Result Value Ref Range    Sodium 137 136 - 145 mmol/L    Potassium 4.0 3.4 - 5.3 mmol/L    Chloride 100 98 - 107 mmol/L    Carbon Dioxide (CO2) 22 22 - 29 mmol/L    Anion Gap 15 7 - 15 mmol/L    Urea Nitrogen 8.8 8.0 - 23.0 mg/dL    Creatinine 0.76 0.51 - 0.95 mg/dL    Calcium 10.2 8.8 - 10.2 mg/dL    Glucose 143 (H) 70 - 99 mg/dL    Alkaline Phosphatase 105 (H) 35 - 104 U/L    AST 22 10 - 35 U/L    ALT 19 10 - 35 U/L    Protein Total 8.0 6.4 - 8.3 g/dL    Albumin 4.9 3.5 - 5.2 g/dL    Bilirubin Total 0.6 <=1.2 mg/dL    GFR Estimate 86 >60 mL/min/1.73m2   CBC with platelets and differential   Result Value Ref Range    WBC Count 11.6 (H) 4.0 - 11.0 10e3/uL    RBC Count 5.18 3.80 - 5.20 10e6/uL    Hemoglobin 16.1 (H) 11.7 - 15.7 g/dL    Hematocrit 46.2 35.0 - 47.0 %    MCV 89 78 - 100 fL    MCH 31.1 26.5 - 33.0 pg    MCHC 34.8 31.5 - 36.5 g/dL    RDW 13.2 10.0 - 15.0 %    Platelet Count 290 150 - 450 10e3/uL    % Neutrophils 73 %    % Lymphocytes 19 %    % Monocytes 5 %    % Eosinophils 2 %    % Basophils 1 %    % Immature Granulocytes 0 %    NRBCs per 100 WBC 0 <1 /100    Absolute Neutrophils 8.5 (H) 1.6 - 8.3 10e3/uL    Absolute Lymphocytes 2.2 0.8 - 5.3 10e3/uL    Absolute Monocytes 0.6 0.0 - 1.3 10e3/uL    Absolute Eosinophils 0.2 0.0 - 0.7 10e3/uL    Absolute Basophils 0.1 0.0 - 0.2 10e3/uL    Absolute Immature Granulocytes 0.0 <=0.4 10e3/uL    Absolute NRBCs 0.0 10e3/uL   Extra Tube    Narrative    The following orders were created for panel order Extra Tube.  Procedure                               Abnormality         Status                     ---------                               -----------         ------                     Extra Blue Top Tube[[id number]]                              In process                 Extra Red Top Tube[[id number]]                               In process                   Please view results for these tests on the individual orders.       Medications - No data to  display    Assessments & Plan (with Medical Decision Making)     I have reviewed the nursing notes.    I have reviewed the findings, diagnosis, plan and need for follow up with the patient.  Labs have returned and are reassuring as above.  Patient remains stable with no arrhythmia or other concerns.  I spoke with Dr. Johansen, hospitalist who has accepted the patient for admission.          New Prescriptions    No medications on file       Final diagnoses:   SVT (supraventricular tachycardia) (H)   AICD discharge       1/11/2023   [facility]     Peyman Huitron MD  01/11/23 1148       Peyman Huitron MD  01/11/23 1150

## 2023-01-11 NOTE — PHARMACY
Pharmacy -- Admission Medication Reconciliation    Prior to admission (PTA) medications were reviewed and the patient's PTA medication list was updated.    Sources Consulted: Patient, Surescripts, Care everywhere ([place]      The reliability of this Medication Reconciliation is: Reliability: Reliable    The following significant changes were made:  Removed:   -Omeprazole  Changed:   -Rosuvastatin, was 5 mg, now 10 mg.  -Levothyroxine, was 25 mg, now 50 mg  Added:  - Sotalol, patient has been taking 120 mg BID. Note,This is planned to be discontinued and replaced with amiodarone.    In addition, the patient's allergies were reviewed with the patient and updated as follows:   Allergies: Amitriptyline, Doxylamine, Fluticasone, and Penicillins    The pharmacist has reviewed with the patient that all personal medications should be removed from the building or locked in the belongings safe.  Patient shall only take medications ordered by the physician and administered by the nursing staff.       Medication barriers identified: none identified   Medication adherence concerns: none identified   Understanding of emergency medications: no emergency medication at this time.    Scott Gautam, 1/11/2023,  1:02 PM 2

## 2023-01-11 NOTE — H&P
[facility]    History and Physical - Hospitalist Service       Date of Admission:  1/11/2023    Assessment & Plan      Hayden Ospina is a 67 year old female admitted on 1/11/2023. She presented after her AICD fired. She is followed at [place]. VTach seen. She was on sotalol, plan to admit to transition to amiodarone.     Vtach  AICD. Lytes normal. Trop neg x1.   -admit to ICU  -tele  -stop sotalol  -transition to amiodarone per cardiology team at [place]. Start tomorrow AM, after off sotalol for 24 hours. Dose as follows:    She recommended starting with 400 mg 4 times daily for 3 days, followed by 400 mg twice daily for 5 days, followed by 400 mg daily for 1 week, followed by 200 mg daily maintenance dose after that.   -monitor daily lytes  -repeat trop in AM  -CXR  -regular diet  -ambulate as able.        Diet: NPO for Medical/Clinical Reasons Except for: Meds    DVT Prophylaxis: Enoxaparin (Lovenox) SQ  Sosa Catheter: Not present  Lines: None     Cardiac Monitoring: None  Code Status:   FULL    Clinically Significant Risk Factors Present on Admission           # Hypercalcemia: Highest Ca = 10.2 mg/dL in last 2 days, will monitor as appropriate                     Disposition Plan      Expected Discharge Date: 01/13/2023                  Caitlyn Johansen DO  Hospitalist Service  [facility]  Securely message with [employer] (more info)  Text page via [facility] Paging/Directory     _____________________________________________________________________    Chief Complaint   AICD fired x2 at home    History is obtained from the patient    History of Present Illness   Hayden Ospina is a 67 year old female who presents to the emergency room for AICD firing.  Its happened twice over the last 2 weeks.  She is normally on sotalol through her cardiology team at [place].  There was dose confusion over the holiday and she was only taking 1 tablet/day when she is supposed be taking 1 tablet of the  "sotalol twice per day.  She received 1 shock just before the new year.  They called to clarify medication dosing.  She went back to her 1 tablet twice a day dosing.  However, she had another shock yesterday.  This was preceded by a near syncopal event.  She was already on her knees on the ground and it was witnessed.  No seizure activity.  She did not hit her head.  No other injuries.  No other recent medications changes.  No drug use.  No nausea or vomiting.  No headaches fevers or chills.  No recent viral illnesses.      Past Medical History    Past Medical History:   Diagnosis Date     Angioma      Anxiety      Anxiety      AV gurpreet re-entry tachycardia (H)      AVNRT (AV gurpreet re-entry tachycardia) (H)      CPT2 deficiency (H)      Diastolic dysfunction      Dysphagia      Encephalitis      Hiatal hernia      Hiatal hernia      HLD (hyperlipidemia)      Hyperlipidemia      IBS (irritable bowel syndrome)      ICD (implantable cardioverter-defibrillator) battery depletion      Lactic acidosis      Malignant neoplasm of skin      Meningitis      Migraine      Myalgia      Nevus     widespread     Palpitations      Peptic ulcer      Psoriasis      Psoriasis      Retinal detachment      Sarcoid myocarditis      Sarcoidosis      Swallowing difficulty      Tachycardia      Thyroid disease      Uveitis      Ventricular tachyarrhythmia      VT (ventricular tachycardia)        Past Surgical History   Past Surgical History:   Procedure Laterality Date     APPENDECTOMY       CARDIAC SURGERY      ICD pacemaker     CHOLECYSTECTOMY       ENT SURGERY      tonsillectomy     EYE SURGERY      retinal detachment     GYN SURGERY      fallopian tube resection/tubal     ORTHOPEDIC SURGERY      \"knee surgery\"     REPAIR PTOSIS BILATERAL Bilateral 9/3/2019    Procedure: BILATERAL UPPER LID PTOSIS AND MECHANICAL PTOSIS REPAIR;  Surgeon: Ehsan Talamantes MD;  Location:  OR       Prior to Admission Medications   Prior to Admission " Medications   Prescriptions Last Dose Informant Patient Reported? Taking?   acetaminophen (TYLENOL) 325 MG tablet   Yes No   Sig: Take 325 mg by mouth every 4 hours as needed for mild pain   levothyroxine (SYNTHROID/LEVOTHROID) 25 MCG tablet   Yes No   Sig: Take 25 mcg by mouth daily   omeprazole (PRILOSEC) 40 MG DR capsule   Yes No   Sig: Take 40 mg by mouth daily   rosuvastatin (CRESTOR) 5 MG tablet   Yes No   Sig: Take 5 mg by mouth daily      Facility-Administered Medications: None        Review of Systems    The 10 point Review of Systems is negative other than noted in the HPI or here.     Allergies   Allergies   Allergen Reactions     Amitriptyline      Increased intraocular pressure     Doxylamine Hives     doxylamin-pse-dm-acetaminophen (cough medicine)     Fluticasone      Fluticasone propion-salmeterol-pt unusure     Penicillins Nausea and Vomiting     With most antibiotics per pt report.  Pt denies anaphylaxis (as on H&P)        Physical Exam   Vital Signs: Temp: 97.7  F (36.5  C) Temp src: Tympanic BP: 132/87 Pulse: 65   Resp: 10 SpO2: 98 %      Weight: 133 lbs 0 oz    General Appearance: Awake alert and oriented.  No acute distress.    Eyes: Extraocular muscle intact.  Lids normal   HEENT: normocephalic, atraumatic.  Respiratory: Clear to auscultation bilaterally.  No wheezing rhonchi or rales.  Cardiovascular:  regular rate.  No murmur.  GI: Abdomen is soft, nontender, nondistended  Skin: Warm, dry, intact.  There is a psoriatic rash of the sacral area and the left elbow.  Musculoskeletal: Moves arms and legs equally normally.  Normal tone and strength throughout.  Neurologic: No focal deficits  Psychiatric: Appropriate affect and insight    Medical Decision Making      70 MINUTES SPENT BY ME on the date of service doing chart review, history, exam, documentation & further activities per the note.  Tests personally interpreted in the past 24 hours:  - EKG tracing showing NSR  - CHEST XRAY showing  pending  Medical complexity over the past 24 hours:  - Decision regarding ESCALATION OF LEVEL OF CARE  - Prescription DRUG MANAGEMENT performed      Data     I have personally reviewed the following data over the past 24 hrs:    11.6 (H)  \   16.1 (H)   / 290     137 100 8.8 /  143 (H)   4.0 22 0.76 \       ALT: 19 AST: 22 AP: 105 (H) TBILI: 0.6   ALB: 4.9 TOT PROTEIN: 8.0 LIPASE: N/A       Trop: 8 BNP: N/A       Imaging results reviewed over the past 24 hrs:   No results found for this or any previous visit (from the past 24 hour(s)).

## 2023-01-12 LAB
ANION GAP SERPL CALCULATED.3IONS-SCNC: 12 MMOL/L (ref 7–15)
ATRIAL RATE - MUSE: 74 BPM
BUN SERPL-MCNC: 12.7 MG/DL (ref 8–23)
CALCIUM SERPL-MCNC: 8.9 MG/DL (ref 8.8–10.2)
CHLORIDE SERPL-SCNC: 104 MMOL/L (ref 98–107)
CREAT SERPL-MCNC: 0.71 MG/DL (ref 0.51–0.95)
DEPRECATED HCO3 PLAS-SCNC: 22 MMOL/L (ref 22–29)
DIASTOLIC BLOOD PRESSURE - MUSE: NORMAL MMHG
ERYTHROCYTE [DISTWIDTH] IN BLOOD BY AUTOMATED COUNT: 13.1 % (ref 10–15)
GFR SERPL CREATININE-BSD FRML MDRD: >90 ML/MIN/1.73M2
GLUCOSE BLDC GLUCOMTR-MCNC: 110 MG/DL (ref 70–99)
GLUCOSE BLDC GLUCOMTR-MCNC: 98 MG/DL (ref 70–99)
GLUCOSE SERPL-MCNC: 101 MG/DL (ref 70–99)
HCT VFR BLD AUTO: 40.2 % (ref 35–47)
HGB BLD-MCNC: 14.1 G/DL (ref 11.7–15.7)
INTERPRETATION ECG - MUSE: NORMAL
MAGNESIUM SERPL-MCNC: 1.9 MG/DL (ref 1.7–2.3)
MCH RBC QN AUTO: 30.8 PG (ref 26.5–33)
MCHC RBC AUTO-ENTMCNC: 35.1 G/DL (ref 31.5–36.5)
MCV RBC AUTO: 88 FL (ref 78–100)
P AXIS - MUSE: 81 DEGREES
PLATELET # BLD AUTO: 219 10E3/UL (ref 150–450)
POTASSIUM SERPL-SCNC: 3.8 MMOL/L (ref 3.4–5.3)
PR INTERVAL - MUSE: 148 MS
QRS DURATION - MUSE: 74 MS
QT - MUSE: 480 MS
QTC - MUSE: 532 MS
R AXIS - MUSE: 85 DEGREES
RBC # BLD AUTO: 4.58 10E6/UL (ref 3.8–5.2)
SODIUM SERPL-SCNC: 138 MMOL/L (ref 136–145)
SYSTOLIC BLOOD PRESSURE - MUSE: NORMAL MMHG
T AXIS - MUSE: 77 DEGREES
TROPONIN T SERPL HS-MCNC: 11 NG/L
VENTRICULAR RATE- MUSE: 74 BPM
WBC # BLD AUTO: 8.6 10E3/UL (ref 4–11)

## 2023-01-12 PROCEDURE — 36415 COLL VENOUS BLD VENIPUNCTURE: CPT | Performed by: FAMILY MEDICINE

## 2023-01-12 PROCEDURE — 80048 BASIC METABOLIC PNL TOTAL CA: CPT | Performed by: FAMILY MEDICINE

## 2023-01-12 PROCEDURE — 99232 SBSQ HOSP IP/OBS MODERATE 35: CPT | Performed by: FAMILY MEDICINE

## 2023-01-12 PROCEDURE — 83735 ASSAY OF MAGNESIUM: CPT | Performed by: FAMILY MEDICINE

## 2023-01-12 PROCEDURE — 84484 ASSAY OF TROPONIN QUANT: CPT | Mod: 91 | Performed by: FAMILY MEDICINE

## 2023-01-12 PROCEDURE — 85027 COMPLETE CBC AUTOMATED: CPT | Performed by: FAMILY MEDICINE

## 2023-01-12 PROCEDURE — 250N000013 HC RX MED GY IP 250 OP 250 PS 637: Performed by: FAMILY MEDICINE

## 2023-01-12 PROCEDURE — 200N000001 HC R&B ICU

## 2023-01-12 RX ORDER — AMIODARONE HYDROCHLORIDE 200 MG/1
200 TABLET ORAL 2 TIMES DAILY
Status: DISCONTINUED | OUTPATIENT
Start: 2023-01-15 | End: 2023-01-12

## 2023-01-12 RX ORDER — AMIODARONE HYDROCHLORIDE 200 MG/1
400 TABLET ORAL 2 TIMES DAILY
Status: DISCONTINUED | OUTPATIENT
Start: 2023-01-12 | End: 2023-01-12

## 2023-01-12 RX ORDER — AMIODARONE HYDROCHLORIDE 200 MG/1
200 TABLET ORAL DAILY
Status: DISCONTINUED | OUTPATIENT
Start: 2023-01-18 | End: 2023-01-12

## 2023-01-12 RX ORDER — AMIODARONE HYDROCHLORIDE 200 MG/1
200 TABLET ORAL 2 TIMES DAILY
Status: DISCONTINUED | OUTPATIENT
Start: 2023-01-16 | End: 2023-01-12

## 2023-01-12 RX ORDER — AMIODARONE HYDROCHLORIDE 200 MG/1
400 TABLET ORAL DAILY
Status: DISCONTINUED | OUTPATIENT
Start: 2023-01-20 | End: 2023-01-13 | Stop reason: HOSPADM

## 2023-01-12 RX ORDER — AMIODARONE HYDROCHLORIDE 200 MG/1
200 TABLET ORAL DAILY
Status: DISCONTINUED | OUTPATIENT
Start: 2023-01-17 | End: 2023-01-12

## 2023-01-12 RX ORDER — AMIODARONE HYDROCHLORIDE 200 MG/1
400 TABLET ORAL 4 TIMES DAILY
Status: DISCONTINUED | OUTPATIENT
Start: 2023-01-12 | End: 2023-01-13 | Stop reason: HOSPADM

## 2023-01-12 RX ORDER — AMIODARONE HYDROCHLORIDE 200 MG/1
400 TABLET ORAL 2 TIMES DAILY
Status: DISCONTINUED | OUTPATIENT
Start: 2023-01-15 | End: 2023-01-13 | Stop reason: HOSPADM

## 2023-01-12 RX ORDER — AMIODARONE HYDROCHLORIDE 200 MG/1
400 TABLET ORAL 4 TIMES DAILY
Status: DISCONTINUED | OUTPATIENT
Start: 2023-01-12 | End: 2023-01-12

## 2023-01-12 RX ORDER — ATORVASTATIN CALCIUM 40 MG/1
40 TABLET, FILM COATED ORAL AT BEDTIME
Status: DISCONTINUED | OUTPATIENT
Start: 2023-01-12 | End: 2023-01-13 | Stop reason: HOSPADM

## 2023-01-12 RX ADMIN — AMIODARONE HYDROCHLORIDE 400 MG: 200 TABLET ORAL at 09:12

## 2023-01-12 RX ADMIN — LEVOTHYROXINE SODIUM 50 MCG: 0.03 TABLET ORAL at 09:12

## 2023-01-12 RX ADMIN — AMIODARONE HYDROCHLORIDE 400 MG: 200 TABLET ORAL at 15:42

## 2023-01-12 RX ADMIN — ATORVASTATIN CALCIUM 40 MG: 40 TABLET, FILM COATED ORAL at 21:27

## 2023-01-12 RX ADMIN — AMIODARONE HYDROCHLORIDE 400 MG: 200 TABLET ORAL at 11:59

## 2023-01-12 RX ADMIN — DIAZEPAM 5 MG: 5 TABLET ORAL at 07:42

## 2023-01-12 RX ADMIN — AMIODARONE HYDROCHLORIDE 400 MG: 200 TABLET ORAL at 21:27

## 2023-01-12 RX ADMIN — ACETAMINOPHEN 325 MG: 325 TABLET ORAL at 07:42

## 2023-01-12 ASSESSMENT — ACTIVITIES OF DAILY LIVING (ADL)
ADLS_ACUITY_SCORE: 20

## 2023-01-12 NOTE — PROGRESS NOTES
[facility]    Medicine Progress Note - Hospitalist Service    Date of Admission:  1/11/2023    Assessment & Plan      Hayden Ospina is a 67 year old female admitted on 1/11/2023. She presented after her AICD fired. She is followed at [place]. VTach seen. She was on sotalol, plan to admit to transition to amiodarone.     Vtach  AICD. Lytes normal. Trop neg x2. No issues overnight on Tele.   -continue ICU  -tele  -stop sotalol  -transition to amiodarone per cardiology team at [place]  Dose as follows:    She recommended starting with 400 mg 4 times daily for 3 days, followed by 400 mg twice daily for 5 days, followed by 400 mg daily for 1 week, followed by 200 mg daily maintenance dose after that.   -monitor daily lytes  -regular diet  -ambulate as able.   -if all stable in AM, can discharge home.     Smoker.  -Declined nicotine patch  -encourage cessation         Diet: Combination Diet Regular Diet Adult    DVT Prophylaxis: Enoxaparin (Lovenox) SQ  Sosa Catheter: Not present  Lines: None     Cardiac Monitoring: ACTIVE order. Indication: ICU  Code Status: Full Code      Clinically Significant Risk Factors Present on Admission           # Hypercalcemia: Highest Ca = 10.2 mg/dL in last 2 days, will monitor as appropriate                     Disposition Plan             Caitlyn Johansen,   Hospitalist Service  [facility]  Securely message with [employer] (more info)  Text page via [facility] Paging/Directory   ________________________________________________________________    Interval History   Doing well this morning.  No shocks or defibrillations overnight delivered by her AICD.  No chest pain or shortness of breath.  Otherwise feeling well.    Physical Exam   Vital Signs: Temp: 97.6  F (36.4  C) Temp src: Tympanic BP: (!) 156/80 Pulse: 75   Resp: 14 SpO2: 97 %      Weight: 129 lbs 0 oz    General Appearance: Awake, alert and oriented.  No acute distress  Respiratory: Clear to  auscultation bilaterally.  No wheezing rhonchi or rales   Cardiovascular: regular rate.  No edema  Skin: Warm and dry.  Other:      Medical Decision Making     40 MINUTES SPENT BY ME on the date of service doing chart review, history, exam, documentation & further activities per the note.      Data     I have personally reviewed the following data over the past 24 hrs:    8.6  \   14.1   / 219     138 104 12.7 /  110 (H)   3.8 22 0.71 \       ALT: N/A AST: N/A AP: N/A TBILI: N/A   ALB: N/A TOT PROTEIN: N/A LIPASE: N/A       Trop: 11 BNP: N/A       Imaging results reviewed over the past 24 hrs:   Recent Results (from the past 24 hour(s))   XR Chest Port 1 View    Narrative    Procedure:XR CHEST PORT 1 VIEW    Clinical history:Female, 67 years, AICD discharge    Technique: Single view was obtained.    Comparison: No relevant prior imaging.    Findings: The cardiac silhouette is normal. The pulmonary vasculature  is normal.    The lungs hyperinflated however appear to be clear. Bony structures  are unremarkable. Transvenous pacer/defibrillator device appears  intact.      Impression    Impression:   Hyperinflation of the lungs without evidence of pneumonia, CHF or  other acute abnormality.    SHANAE HUMPHREYS MD         SYSTEM ID:  [id number]

## 2023-01-12 NOTE — PROGRESS NOTES
Patient is negative for orthostatic hypotension. Patient also reports her normal resting HR baseline is between 60 - 70 bpm, which she has maintained after two doses of loading dose amio. Will continue to monitor closely.

## 2023-01-12 NOTE — PLAN OF CARE
Problem: Plan of Care - These are the overarching goals to be used throughout the patient stay.    Goal: Absence of Hospital-Acquired Illness or Injury  Intervention: Identify and Manage Fall Risk  Recent Flowsheet Documentation  Taken 1/11/2023 1930 by Mona Muhammad RN  Safety Promotion/Fall Prevention:   clutter free environment maintained   fall prevention program maintained   safety round/check completed  Intervention: Prevent Skin Injury  Recent Flowsheet Documentation  Taken 1/11/2023 1930 by Mona Muhammad RN  Body Position: position changed independently  Taken 1/11/2023 1900 by Mona Muhammad RN  Body Position: position changed independently  Intervention: Prevent and Manage VTE (Venous Thromboembolism) Risk  Recent Flowsheet Documentation  Taken 1/11/2023 1930 by Mona Muhammad RN  VTE Prevention/Management: patient refused intervention  Goal: Optimal Comfort and Wellbeing  Intervention: Monitor Pain and Promote Comfort  Recent Flowsheet Documentation  Taken 1/11/2023 1900 by Moan Muhammad RN  Pain Management Interventions:   diversional activity provided   declines   Goal Outcome Evaluation:

## 2023-01-12 NOTE — UTILIZATION REVIEW
Admission Status; Secondary Review Determination       Under the authority of the Utilization Management Committee, the utilization review process indicated a secondary review on the above patient. The review outcome is based on review of the medical records, discussions with staff, and applying clinical experience noted on the date of the review.     (x) Inpatient Status Appropriate - This patient's medical care is consistent with medical management for inpatient care and reasonable inpatient medical practice.     RATIONALE FOR DETERMINATION     Patient requires inpatient admission versus short stay observation or outpatient treatment for the following reasons: Patient admitted to [facility] yesterday for VT.  Her AICD fired and she had syncopal event.  [place] cardiology contacted and advised to stop sotalol and transition to amiodarone.  [place] recommended monitoring patient in hospital for approx 3 days while transitioning and to start 24 hours after the sotalol.  Patient is scheduled to start oral amiodarone this morning QID.   Transition from sotalol to amiodarone carries a significant risk for arrhythmia, such as torsade or other ventricular malignant arrhythmias and would require close cardiac monitoring including frequent heart rate check, QT interval and hemodynamic monitoring to ensure prompt intervention in a patient at significant risk.  The expected length of stay at the time of admission was more than 2 nights because of the severity of illness, intensity of service provided, and risk for adverse outcome. Inpatient admission is appropriate.         This document was produced using voice recognition software       The information on this document is developed by the utilization review team in order for the business office to ensure compliance. This only denotes the appropriateness of proper admission status and does not reflect the quality of care rendered.   The definitions of Inpatient Status and  Observation Status used in making the determination above are those provided in the CMS Coverage Manual, Chapter 1 and Chapter 6, section 70.4.   Sincerely,   KATHY ABRAMS MD   System Medical Director   Utilization Management   [facility].

## 2023-01-13 VITALS
OXYGEN SATURATION: 97 % | TEMPERATURE: 97.5 F | HEART RATE: 66 BPM | SYSTOLIC BLOOD PRESSURE: 153 MMHG | BODY MASS INDEX: 20.83 KG/M2 | HEIGHT: 65 IN | RESPIRATION RATE: 14 BRPM | DIASTOLIC BLOOD PRESSURE: 95 MMHG | WEIGHT: 125 LBS

## 2023-01-13 DIAGNOSIS — I47.20 VENTRICULAR TACHYCARDIA (H): ICD-10-CM

## 2023-01-13 LAB
ANION GAP SERPL CALCULATED.3IONS-SCNC: 10 MMOL/L (ref 7–15)
BUN SERPL-MCNC: 14.3 MG/DL (ref 8–23)
CALCIUM SERPL-MCNC: 9.2 MG/DL (ref 8.8–10.2)
CHLORIDE SERPL-SCNC: 106 MMOL/L (ref 98–107)
CREAT SERPL-MCNC: 0.81 MG/DL (ref 0.51–0.95)
DEPRECATED HCO3 PLAS-SCNC: 24 MMOL/L (ref 22–29)
ERYTHROCYTE [DISTWIDTH] IN BLOOD BY AUTOMATED COUNT: 13.2 % (ref 10–15)
GFR SERPL CREATININE-BSD FRML MDRD: 80 ML/MIN/1.73M2
GLUCOSE SERPL-MCNC: 106 MG/DL (ref 70–99)
HCT VFR BLD AUTO: 40.9 % (ref 35–47)
HGB BLD-MCNC: 14.1 G/DL (ref 11.7–15.7)
HOLD SPECIMEN: NORMAL
HOLD SPECIMEN: NORMAL
MAGNESIUM SERPL-MCNC: 1.9 MG/DL (ref 1.7–2.3)
MCH RBC QN AUTO: 30.9 PG (ref 26.5–33)
MCHC RBC AUTO-ENTMCNC: 34.5 G/DL (ref 31.5–36.5)
MCV RBC AUTO: 90 FL (ref 78–100)
PLATELET # BLD AUTO: 212 10E3/UL (ref 150–450)
POTASSIUM SERPL-SCNC: 4 MMOL/L (ref 3.4–5.3)
RBC # BLD AUTO: 4.56 10E6/UL (ref 3.8–5.2)
SODIUM SERPL-SCNC: 140 MMOL/L (ref 136–145)
WBC # BLD AUTO: 9.9 10E3/UL (ref 4–11)

## 2023-01-13 PROCEDURE — 99239 HOSP IP/OBS DSCHRG MGMT >30: CPT | Performed by: INTERNAL MEDICINE

## 2023-01-13 PROCEDURE — 36415 COLL VENOUS BLD VENIPUNCTURE: CPT | Performed by: FAMILY MEDICINE

## 2023-01-13 PROCEDURE — 83735 ASSAY OF MAGNESIUM: CPT | Performed by: FAMILY MEDICINE

## 2023-01-13 PROCEDURE — 85027 COMPLETE CBC AUTOMATED: CPT | Performed by: FAMILY MEDICINE

## 2023-01-13 PROCEDURE — 250N000013 HC RX MED GY IP 250 OP 250 PS 637: Performed by: FAMILY MEDICINE

## 2023-01-13 PROCEDURE — 80048 BASIC METABOLIC PNL TOTAL CA: CPT | Performed by: FAMILY MEDICINE

## 2023-01-13 RX ORDER — AMIODARONE HYDROCHLORIDE 400 MG/1
400 TABLET ORAL DAILY
Qty: 7 TABLET | Refills: 0 | Status: SHIPPED | OUTPATIENT
Start: 2023-01-20 | End: 2023-01-27

## 2023-01-13 RX ORDER — AMIODARONE HYDROCHLORIDE 200 MG/1
200 TABLET ORAL DAILY
Qty: 30 TABLET | Refills: 11 | Status: SHIPPED | OUTPATIENT
Start: 2023-01-28 | End: 2023-01-17

## 2023-01-13 RX ORDER — AMIODARONE HYDROCHLORIDE 400 MG/1
400 TABLET ORAL 4 TIMES DAILY
Qty: 6 TABLET | Refills: 0 | Status: SHIPPED | OUTPATIENT
Start: 2023-01-13 | End: 2023-01-15

## 2023-01-13 RX ORDER — AMIODARONE HYDROCHLORIDE 400 MG/1
400 TABLET ORAL 2 TIMES DAILY
Qty: 10 TABLET | Refills: 0 | Status: SHIPPED | OUTPATIENT
Start: 2023-01-15 | End: 2023-01-20

## 2023-01-13 RX ADMIN — ACETAMINOPHEN 650 MG: 325 TABLET ORAL at 01:33

## 2023-01-13 RX ADMIN — LEVOTHYROXINE SODIUM 50 MCG: 0.03 TABLET ORAL at 10:14

## 2023-01-13 RX ADMIN — AMIODARONE HYDROCHLORIDE 400 MG: 200 TABLET ORAL at 11:02

## 2023-01-13 RX ADMIN — AMIODARONE HYDROCHLORIDE 400 MG: 200 TABLET ORAL at 08:12

## 2023-01-13 RX ADMIN — DIAZEPAM 5 MG: 5 TABLET ORAL at 01:33

## 2023-01-13 RX ADMIN — PANTOPRAZOLE SODIUM 40 MG: 40 TABLET, DELAYED RELEASE ORAL at 10:15

## 2023-01-13 ASSESSMENT — ACTIVITIES OF DAILY LIVING (ADL)
ADLS_ACUITY_SCORE: 20

## 2023-01-13 NOTE — PROGRESS NOTES
Pt vitally stable. Has no complaints at this time. Denies pain. Assessment unremarkable. Plan to discharge home today. Discharge instructions reviewed with patient.

## 2023-01-13 NOTE — PROGRESS NOTES
"BP (!) 147/101   Pulse 65   Temp 97  F (36.1  C) (Tympanic)   Resp (!) 7   Ht 1.651 m (5' 5\")   Wt 56.7 kg (125 lb)   SpO2 98%   BMI 20.80 kg/m      Pt complains of \"Sternum pain\" and GI upset, feeling like she will vomit but has had no emesis.  Afebrile, HR unchanged.  On assessment pt is diaphoretic, needed gown and linen change.  Adequate output.  Prn tylenol and valium administered with moderate relief. Tele-ICU notified of complaints.  Appreciate tele- icu consideration, no new orders at this time.  Monitor for change in condition.  Pt declines HS Lovenox.   Sariah De Guzman RN.............................1/13/2023 2:13 AM    "

## 2023-01-13 NOTE — PROGRESS NOTES
Pt requests minimal sleep interruptions.  Will allow for continuous rest, contingent on stable condition.

## 2023-01-13 NOTE — DISCHARGE SUMMARY
"[facility]  Hospitalist Discharge Summary      Date of Admission:  1/11/2023  Date of Discharge:  1/13/2023  Discharging Provider: Deon Guzmán MD  Discharge Service: Hospitalist Service    Discharge Diagnoses   Principal Problem:    V Tach    AICD discharge  Active Problems:    SVT (supraventricular tachycardia) (H)    Tobacco use      Follow-ups Needed After Discharge   Follow-up Appointments     Follow-up and recommended labs and tests       We will call you with your [facility] appt.             Discharge Disposition   Discharged to home  Condition at discharge: Stable      Hospital Course   Per the H&P:  \"Hayden Ospina is a 67 year old female who presents to the emergency room for AICD firing.  Its happened twice over the last 2 weeks.  She is normally on sotalol through her cardiology team at [place].  There was dose confusion over the holiday and she was only taking 1 tablet/day when she is supposed be taking 1 tablet of the sotalol twice per day.  She received 1 shock just before the new year.  They called to clarify medication dosing.  She went back to her 1 tablet twice a day dosing.  However, she had another shock yesterday.  This was preceded by a near syncopal event.  She was already on her knees on the ground and it was witnessed.  No seizure activity.  She did not hit her head.  No other injuries.  No other recent medications changes.  No drug use.  No nausea or vomiting.  No headaches fevers or chills.  No recent viral illnesses.\"    Discussion with [facility] resulted in an amiodarone load. She was observed in the ICU with no arrhythmia. The amiodarone load is as follows: 400 mg 4 times daily for 3 days, followed by 400 mg twice daily for 5 days, followed by 400 mg daily for 1 week, followed by 200 mg daily maintenance dose after that.     Consultations This Hospital Stay   None    Code Status   Full Code    Time Spent on this Encounter   I, Deon Guzmán MD, " personally saw the patient today and spent greater than 30 minutes discharging this patient.       Deon Guzmán MD  [facility]  [address]  [place] [address]  Phone: [phone]  Fax: [phone]  ______________________________________________________________________    Physical Exam   Vital Signs: Temp: 97.5  F (36.4  C) Temp src: Temporal BP: (!) 153/95 Pulse: 66   Resp: 14 SpO2: 97 % O2 Device: None (Room air)    Weight: 125 lbs 0 oz  GENERAL: Comfortable, no apparent distress.  CARDIOVASCULAR: regular rate and rhythm, no murmur. No lower extremity edema   RESPIRATORY: Clear to auscultation bilaterally, no wheezes or crackles.  GI: non-tender, non-distended, normal bowel sounds.   SKIN: warm periphery, no rashes         Primary Care Physician   Rayna Haynes    Discharge Orders      Reason for your hospital stay    AICD discharge     Follow-up and recommended labs and tests     We will call you with your [facility] appt.     Activity    Your activity upon discharge: activity as tolerated     Diet    Follow this diet upon discharge: Orders Placed This Encounter      Combination Diet Regular Diet Adult       Significant Results and Procedures   Most Recent 3 CBC's:Recent Labs   Lab Test 01/13/23  0514 01/12/23  0529 01/11/23  1053   WBC 9.9 8.6 11.6*   HGB 14.1 14.1 16.1*   MCV 90 88 89    219 290     Most Recent 3 BMP's:Recent Labs   Lab Test 01/13/23  0514 01/12/23  1345 01/12/23  0946 01/12/23  0529 01/11/23  1053     --   --  138 137   POTASSIUM 4.0  --   --  3.8 4.0   CHLORIDE 106  --   --  104 100   CO2 24  --   --  22 22   BUN 14.3  --   --  12.7 8.8   CR 0.81  --   --  0.71 0.76   ANIONGAP 10  --   --  12 15   GILDARDO 9.2  --   --  8.9 10.2   * 98 110* 101* 143*     Most Recent 2 LFT's:Recent Labs   Lab Test 01/11/23  1053   AST 22   ALT 19   ALKPHOS 105*   BILITOTAL 0.6   ,   Results for orders placed or performed during the  hospital encounter of 01/11/23   XR Chest Port 1 View    Narrative    Procedure:XR CHEST PORT 1 VIEW    Clinical history:Female, 67 years, AICD discharge    Technique: Single view was obtained.    Comparison: No relevant prior imaging.    Findings: The cardiac silhouette is normal. The pulmonary vasculature  is normal.    The lungs hyperinflated however appear to be clear. Bony structures  are unremarkable. Transvenous pacer/defibrillator device appears  intact.      Impression    Impression:   Hyperinflation of the lungs without evidence of pneumonia, CHF or  other acute abnormality.    SHANAE HUMPHREYS MD         SYSTEM ID:  [id number]       Discharge Medications   Current Discharge Medication List      START taking these medications    Details   !! amiodarone (PACERONE) 200 MG tablet Take 1 tablet (200 mg) by mouth daily  Qty: 30 tablet, Refills: 11    Associated Diagnoses: Ventricular tachycardia      !! amiodarone (PACERONE) 400 MG tablet 1 tablet (400 mg) by Oral or FT or NG tube route 2 times daily for 5 days  Qty: 10 tablet, Refills: 0    Associated Diagnoses: Ventricular tachycardia      !! amiodarone (PACERONE) 400 MG tablet 1 tablet (400 mg) by Oral or FT or NG tube route 4 times daily for 6 doses  Qty: 6 tablet, Refills: 0    Associated Diagnoses: Ventricular tachycardia      !! amiodarone (PACERONE) 400 MG tablet 1 tablet (400 mg) by Oral or FT or NG tube route daily for 7 days  Qty: 7 tablet, Refills: 0    Associated Diagnoses: Ventricular tachycardia       !! - Potential duplicate medications found. Please discuss with provider.      CONTINUE these medications which have NOT CHANGED    Details   acetaminophen (TYLENOL) 325 MG tablet Take 325 mg by mouth every 4 hours as needed for mild pain      levothyroxine (SYNTHROID/LEVOTHROID) 50 MCG tablet Take 50 mcg by mouth daily      rosuvastatin (CRESTOR) 10 MG tablet Take 10 mg by mouth daily         STOP taking these medications       sotalol (BETAPACE) 120  MG tablet Comments:   Reason for Stopping:             Allergies   Allergies   Allergen Reactions     Amitriptyline      Increased intraocular pressure     Doxylamine Hives     doxylamin-pse-dm-acetaminophen (cough medicine)     Fluticasone      Fluticasone propion-salmeterol-pt unusure     Penicillins Nausea and Vomiting     With most antibiotics per pt report.  Pt denies anaphylaxis (as on H&P)

## 2023-01-13 NOTE — PROGRESS NOTES
Call from Tonny at [facility], Pt's Cardiologist will call pt to set up follow up appointment.

## 2023-01-13 NOTE — PHARMACY - DISCHARGE MEDICATION RECONCILIATION AND EDUCATION
Pharmacy:  Discharge Counseling and Medication Reconciliation    [place] BALDER[facility] Anai  [address] [address]  [place] [id number] [phone] (home)   67 year old female  PCP: Rayna Chavarria    Allergies: Amitriptyline, Doxylamine, Fluticasone, and Penicillins    Discharge Counseling:    Pharmacist met with patient (and/or family) today to review the medication portion of the After Visit Summary (with an emphasis on NEW medications) and to address patient's questions/concerns.    Summary of Education: met with patient to discuss administration, duration and side effects of new medication.  Patient has been on amiodarone in the past, does not have questions.  Talked extensively about taper/dosage and monitoring heart rate/labs.  All questions answered.    Materials Provided:  MedCounselor sheets printed from Clinical Pharmacology on: amiodarone     Discharge Medication Reconciliation:    It has been determined that the patient has an adequate supply of medications available or which can be obtained from the patient's preferred pharmacy, which HE/SHE has confirmed as: [facility]     Thank you for the consult.    Kristal Wade RPH........January 13, 2023 10:47 AM

## 2023-01-16 ENCOUNTER — PATIENT OUTREACH (OUTPATIENT)
Dept: FAMILY MEDICINE | Facility: OTHER | Age: 68
End: 2023-01-16

## 2023-01-16 NOTE — TELEPHONE ENCOUNTER
Patient has PCP elsewhere, no follow-up here. No TCM call required per policy. Anna Hernandez RN on 1/16/2023 at 8:10 AM

## 2023-01-17 NOTE — TELEPHONE ENCOUNTER
Patient requesting 90 day supply of the followin/13/23 [address] Deon Guzmán MD      amiodarone (PACERONE) 200 MG tablet 30 tablet 11 2023  No   Sig - Route: Take 1 tablet (200 mg) by mouth daily - Oral     [id number] - [place], [place] - [address]     Huma Perez RN .............. 2023  3:51 PM

## 2023-01-20 RX ORDER — AMIODARONE HYDROCHLORIDE 200 MG/1
TABLET ORAL
Qty: 90 TABLET | Refills: 3 | Status: SHIPPED | OUTPATIENT
Start: 2023-01-20

## (undated) DEVICE — PACK OCULOPLATIC SEN15OCFSD

## (undated) DEVICE — SU MERSILENE 5-0 S-24 18" 1764G

## (undated) DEVICE — SU PLAIN FAST ABSORB 6-0 PC-1 18" 1916G

## (undated) DEVICE — KIT SURGICAL TURNOVER FVSD-01D

## (undated) DEVICE — SOL WATER IRRIG 1000ML BOTTLE 2F7114

## (undated) DEVICE — SUCTION CANISTER MEDIVAC LINER 3000ML W/LID 65651-530

## (undated) DEVICE — EYE PREP BETADINE 5% SOLUTION 30ML 0065-0411-30

## (undated) DEVICE — PEN MARKING SKIN FINE 31145942

## (undated) DEVICE — ESU PENCIL W/SMOKE EVAC E2515HS

## (undated) DEVICE — NDL ANGIOCATH 20GA 1.25" 4056

## (undated) DEVICE — ESU ELEC NDL 1" E1552

## (undated) DEVICE — GLOVE PROTEXIS W/NEU-THERA 8.5  2D73TE85

## (undated) DEVICE — LINEN TOWEL PACK X5 5464

## (undated) DEVICE — TUBING SUCTION 12"X1/4" N612

## (undated) RX ORDER — PROPOFOL 10 MG/ML
INJECTION, EMULSION INTRAVENOUS
Status: DISPENSED
Start: 2019-09-03

## (undated) RX ORDER — HYDROCODONE BITARTRATE AND ACETAMINOPHEN 5; 325 MG/1; MG/1
TABLET ORAL
Status: DISPENSED
Start: 2019-09-03